# Patient Record
(demographics unavailable — no encounter records)

---

## 2025-03-04 NOTE — HISTORY OF PRESENT ILLNESS
[FreeTextEntry1] : npa: eczema [de-identified] : 15F presenting today as a new patient for evaluation of eczema on the face and hands x years. Presenting today with Mom who notes that Pt was diagnosed with eczemas as a child. Uses hydrocortisone 1% cream which helps but is not enough for current flare on her hands. No other medications used. Has tried multiple otc topicals in the past but Cetaphil works best.  Goes to school in [facility] and notes warm weather helps eczema. Flares when she returns to [place].  She and Mom are concerned with dark spots on her face, unsure if they are sites of previous eczema. Wears [employer] SPF 50 when she remembers.  Of note- it is currently Ramadan and Pt unable to apply any topicals during the day.

## 2025-03-04 NOTE — HISTORY OF PRESENT ILLNESS
[FreeTextEntry1] : npa: eczema [de-identified] : 15F presenting today as a new patient for evaluation of eczema on the face and hands x years. Presenting today with Mom who notes that Pt was diagnosed with eczemas as a child. Uses hydrocortisone 1% cream which helps but is not enough for current flare on her hands. No other medications used. Has tried multiple otc topicals in the past but Cetaphil works best.  Goes to school in [facility] and notes warm weather helps eczema. Flares when she returns to [place].  She and Mom are concerned with dark spots on her face, unsure if they are sites of previous eczema. Wears [employer]ique SPF 50 when she remembers.  Of note- it is currently Ramadan and Pt unable to apply any topicals during the day.

## 2025-03-04 NOTE — PHYSICAL EXAM
[FreeTextEntry3] : Focused skin exam performed  The relevant portions of the exam were performed today  AAOx3, NAD, well-appearing / pleasant Focused examination within normal limits with the exception of:  Mild xerosis on the face, no post-inflammatory hyperpigmentation or hypopigmentation noted  Erythematous thin scaly eczematous plaques on dorsal aspects of a few finers (R hand >> left) with some fissures

## 2025-03-04 NOTE — ASSESSMENT
[FreeTextEntry1] : 1. Atopic dermatitis, chronic, mild flare on hands today 2. 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 - Education and counseling - Gentle skin care reviewed.  - As it is Ramadan, discussed application of topicals at sundown and before sunrise.  - For face, start tacrolimus 0.03% ointment to affected areas BID. SED.  - For hands, start triamcinolone 0.2% ointment BID to affected areas for up to 2 weeks then stop and take at least a 1 week break before starting again. SED including atrophy, dyspigmentation, telangiectasias, striae. Proper use reviewed including only using to affected area and avoidance of prolonged use.  3. Post-inflammatory hyperpigmentation, not appreciated today but continue to monitor as noted by Patient and her mother, favor 2/2 to AD - Diagnosis reviewed. - Goal to control AD on the face and prevent PIH.  - Photoprotection reviewed including sun-protective behaviors, protective clothing, and the use of OTC broad-spectrum SPF 30+ sunscreens was advised given involvement of photo-exposed areas.   RTC 12 wks